# Patient Record
Sex: MALE
[De-identification: names, ages, dates, MRNs, and addresses within clinical notes are randomized per-mention and may not be internally consistent; named-entity substitution may affect disease eponyms.]

---

## 2021-03-28 ENCOUNTER — APPOINTMENT (OUTPATIENT)
Dept: DISASTER EMERGENCY | Facility: OTHER | Age: 46
End: 2021-03-28
Payer: COMMERCIAL

## 2021-03-28 PROCEDURE — 0011A: CPT

## 2021-04-25 ENCOUNTER — APPOINTMENT (OUTPATIENT)
Dept: DISASTER EMERGENCY | Facility: OTHER | Age: 46
End: 2021-04-25
Payer: COMMERCIAL

## 2021-04-25 PROCEDURE — 0012A: CPT

## 2024-09-16 PROBLEM — Z00.00 ENCOUNTER FOR PREVENTIVE HEALTH EXAMINATION: Status: ACTIVE | Noted: 2024-09-16

## 2024-09-24 DIAGNOSIS — I48.91 UNSPECIFIED ATRIAL FIBRILLATION: ICD-10-CM

## 2024-09-24 DIAGNOSIS — G47.33 OBSTRUCTIVE SLEEP APNEA (ADULT) (PEDIATRIC): ICD-10-CM

## 2024-09-24 DIAGNOSIS — E66.01 MORBID (SEVERE) OBESITY DUE TO EXCESS CALORIES: ICD-10-CM

## 2024-09-24 DIAGNOSIS — Z78.9 OTHER SPECIFIED HEALTH STATUS: ICD-10-CM

## 2024-09-24 DIAGNOSIS — I25.10 ATHEROSCLEROTIC HEART DISEASE OF NATIVE CORONARY ARTERY W/OUT ANGINA PECTORIS: ICD-10-CM

## 2024-09-24 DIAGNOSIS — I50.20 UNSPECIFIED SYSTOLIC (CONGESTIVE) HEART FAILURE: ICD-10-CM

## 2024-09-24 RX ORDER — AMIODARONE HYDROCHLORIDE 200 MG/1
200 TABLET ORAL DAILY
Refills: 0 | Status: ACTIVE | COMMUNITY

## 2024-09-24 RX ORDER — METOPROLOL SUCCINATE 100 MG/1
100 TABLET, EXTENDED RELEASE ORAL
Refills: 0 | Status: ACTIVE | COMMUNITY

## 2024-09-24 RX ORDER — SPIRONOLACTONE 25 MG/1
25 TABLET ORAL DAILY
Qty: 30 | Refills: 3 | Status: ACTIVE | COMMUNITY

## 2024-09-24 RX ORDER — LISINOPRIL 5 MG/1
5 TABLET ORAL DAILY
Refills: 0 | Status: ACTIVE | COMMUNITY

## 2024-09-24 RX ORDER — APIXABAN 5 MG/1
5 TABLET, FILM COATED ORAL
Qty: 60 | Refills: 0 | Status: ACTIVE | COMMUNITY

## 2024-09-24 RX ORDER — FUROSEMIDE 40 MG/1
40 TABLET ORAL
Qty: 90 | Refills: 0 | Status: ACTIVE | COMMUNITY

## 2024-10-18 ENCOUNTER — APPOINTMENT (OUTPATIENT)
Age: 49
End: 2024-10-18

## 2024-12-20 ENCOUNTER — APPOINTMENT (OUTPATIENT)
Age: 49
End: 2024-12-20

## 2024-12-20 ENCOUNTER — NON-APPOINTMENT (OUTPATIENT)
Age: 49
End: 2024-12-20

## 2025-01-17 ENCOUNTER — APPOINTMENT (OUTPATIENT)
Age: 50
End: 2025-01-17
Payer: COMMERCIAL

## 2025-01-17 ENCOUNTER — NON-APPOINTMENT (OUTPATIENT)
Age: 50
End: 2025-01-17

## 2025-01-17 DIAGNOSIS — I48.3 TYPICAL ATRIAL FLUTTER: ICD-10-CM

## 2025-01-17 DIAGNOSIS — I48.91 UNSPECIFIED ATRIAL FIBRILLATION: ICD-10-CM

## 2025-01-17 DIAGNOSIS — I50.20 UNSPECIFIED SYSTOLIC (CONGESTIVE) HEART FAILURE: ICD-10-CM

## 2025-01-17 DIAGNOSIS — E66.01 MORBID (SEVERE) OBESITY DUE TO EXCESS CALORIES: ICD-10-CM

## 2025-01-17 PROCEDURE — 93000 ELECTROCARDIOGRAM COMPLETE: CPT

## 2025-01-17 PROCEDURE — 99214 OFFICE O/P EST MOD 30 MIN: CPT | Mod: 25

## 2025-01-29 ENCOUNTER — NON-APPOINTMENT (OUTPATIENT)
Age: 50
End: 2025-01-29

## 2025-01-29 ENCOUNTER — INPATIENT (INPATIENT)
Facility: HOSPITAL | Age: 50
LOS: 0 days | Discharge: HOME | DRG: 310 | End: 2025-01-30
Attending: STUDENT IN AN ORGANIZED HEALTH CARE EDUCATION/TRAINING PROGRAM | Admitting: STUDENT IN AN ORGANIZED HEALTH CARE EDUCATION/TRAINING PROGRAM
Payer: COMMERCIAL

## 2025-01-29 ENCOUNTER — TRANSCRIPTION ENCOUNTER (OUTPATIENT)
Age: 50
End: 2025-01-29

## 2025-01-29 VITALS
WEIGHT: 315 LBS | OXYGEN SATURATION: 96 % | HEART RATE: 145 BPM | RESPIRATION RATE: 17 BRPM | HEIGHT: 76 IN | DIASTOLIC BLOOD PRESSURE: 104 MMHG | SYSTOLIC BLOOD PRESSURE: 161 MMHG

## 2025-01-29 DIAGNOSIS — Z87.81 PERSONAL HISTORY OF (HEALED) TRAUMATIC FRACTURE: Chronic | ICD-10-CM

## 2025-01-29 DIAGNOSIS — I48.91 UNSPECIFIED ATRIAL FIBRILLATION: ICD-10-CM

## 2025-01-29 LAB
ABO RH CONFIRMATION: SIGNIFICANT CHANGE UP
ANION GAP SERPL CALC-SCNC: 11 MMOL/L — SIGNIFICANT CHANGE UP (ref 5–17)
BLD GP AB SCN SERPL QL: SIGNIFICANT CHANGE UP
BUN SERPL-MCNC: 23.2 MG/DL — HIGH (ref 8–20)
CALCIUM SERPL-MCNC: 9.4 MG/DL — SIGNIFICANT CHANGE UP (ref 8.4–10.5)
CHLORIDE SERPL-SCNC: 97 MMOL/L — SIGNIFICANT CHANGE UP (ref 96–108)
CO2 SERPL-SCNC: 28 MMOL/L — SIGNIFICANT CHANGE UP (ref 22–29)
CREAT SERPL-MCNC: 1.17 MG/DL — SIGNIFICANT CHANGE UP (ref 0.5–1.3)
EGFR: 76 ML/MIN/1.73M2 — SIGNIFICANT CHANGE UP
GLUCOSE SERPL-MCNC: 96 MG/DL — SIGNIFICANT CHANGE UP (ref 70–99)
HCT VFR BLD CALC: 44.1 % — SIGNIFICANT CHANGE UP (ref 39–50)
HGB BLD-MCNC: 15 G/DL — SIGNIFICANT CHANGE UP (ref 13–17)
MAGNESIUM SERPL-MCNC: 1.9 MG/DL — SIGNIFICANT CHANGE UP (ref 1.6–2.6)
MCHC RBC-ENTMCNC: 31.1 PG — SIGNIFICANT CHANGE UP (ref 27–34)
MCHC RBC-ENTMCNC: 34 G/DL — SIGNIFICANT CHANGE UP (ref 32–36)
MCV RBC AUTO: 91.5 FL — SIGNIFICANT CHANGE UP (ref 80–100)
PLATELET # BLD AUTO: 214 K/UL — SIGNIFICANT CHANGE UP (ref 150–400)
POTASSIUM SERPL-MCNC: 4.4 MMOL/L — SIGNIFICANT CHANGE UP (ref 3.5–5.3)
POTASSIUM SERPL-SCNC: 4.4 MMOL/L — SIGNIFICANT CHANGE UP (ref 3.5–5.3)
RBC # BLD: 4.82 M/UL — SIGNIFICANT CHANGE UP (ref 4.2–5.8)
RBC # FLD: 13.4 % — SIGNIFICANT CHANGE UP (ref 10.3–14.5)
SODIUM SERPL-SCNC: 135 MMOL/L — SIGNIFICANT CHANGE UP (ref 135–145)
WBC # BLD: 10.99 K/UL — HIGH (ref 3.8–10.5)
WBC # FLD AUTO: 10.99 K/UL — HIGH (ref 3.8–10.5)

## 2025-01-29 PROCEDURE — 93010 ELECTROCARDIOGRAM REPORT: CPT

## 2025-01-29 PROCEDURE — 93655 ICAR CATH ABLTJ DSCRT ARRHYT: CPT | Mod: 1L

## 2025-01-29 PROCEDURE — 93656 COMPRE EP EVAL ABLTJ ATR FIB: CPT | Mod: 1L

## 2025-01-29 RX ORDER — AMIODARONE HYDROCHLORIDE 50 MG/ML
200 INJECTION, SOLUTION INTRAVENOUS DAILY
Refills: 0 | Status: DISCONTINUED | OUTPATIENT
Start: 2025-01-29 | End: 2025-01-30

## 2025-01-29 RX ORDER — ASPIRIN 81 MG/1
1 TABLET, COATED ORAL
Refills: 0 | DISCHARGE

## 2025-01-29 RX ORDER — APIXABAN 5 MG/1
5 TABLET, FILM COATED ORAL
Refills: 0 | Status: DISCONTINUED | OUTPATIENT
Start: 2025-01-29 | End: 2025-01-30

## 2025-01-29 RX ORDER — ONDANSETRON 4 MG/1
4 TABLET, ORALLY DISINTEGRATING ORAL EVERY 4 HOURS
Refills: 0 | Status: DISCONTINUED | OUTPATIENT
Start: 2025-01-29 | End: 2025-01-30

## 2025-01-29 RX ORDER — ASPIRIN 81 MG/1
81 TABLET, COATED ORAL DAILY
Refills: 0 | Status: DISCONTINUED | OUTPATIENT
Start: 2025-01-29 | End: 2025-01-30

## 2025-01-29 RX ORDER — METOPROLOL SUCCINATE 25 MG
100 TABLET, EXTENDED RELEASE 24 HR ORAL DAILY
Refills: 0 | Status: DISCONTINUED | OUTPATIENT
Start: 2025-01-29 | End: 2025-01-30

## 2025-01-29 RX ORDER — ACETAMINOPHEN 160 MG/5ML
650 SUSPENSION ORAL EVERY 6 HOURS
Refills: 0 | Status: DISCONTINUED | OUTPATIENT
Start: 2025-01-29 | End: 2025-01-30

## 2025-01-29 RX ORDER — PHENOL 1.4 %
1 AEROSOL, SPRAY (ML) MUCOUS MEMBRANE
Refills: 0 | Status: DISCONTINUED | OUTPATIENT
Start: 2025-01-29 | End: 2025-01-30

## 2025-01-29 RX ORDER — APIXABAN 5 MG/1
1 TABLET, FILM COATED ORAL
Refills: 0 | DISCHARGE

## 2025-01-29 RX ORDER — ALPRAZOLAM 2 MG
0.25 TABLET ORAL EVERY 6 HOURS
Refills: 0 | Status: DISCONTINUED | OUTPATIENT
Start: 2025-01-29 | End: 2025-01-30

## 2025-01-29 RX ORDER — MAGNESIUM, ALUMINUM HYDROXIDE 200-225/5
30 SUSPENSION, ORAL (FINAL DOSE FORM) ORAL EVERY 6 HOURS
Refills: 0 | Status: DISCONTINUED | OUTPATIENT
Start: 2025-01-29 | End: 2025-01-30

## 2025-01-29 RX ADMIN — APIXABAN 5 MILLIGRAM(S): 5 TABLET, FILM COATED ORAL at 21:12

## 2025-01-29 NOTE — DISCHARGE NOTE PROVIDER - NSDCMRMEDTOKEN_GEN_ALL_CORE_FT
amiodarone 400 mg oral tablet: 1 tab(s) orally 2 times a day  aspirin 81 mg oral tablet, chewable: 1 tab(s) chewed once a day  Eliquis 5 mg oral tablet: 1 tab(s) orally 2 times a day  furosemide 40 mg oral tablet: 1 tab(s) orally once a day  lisinopril 5 mg oral tablet: 1 tab(s) orally once a day  metoprolol succinate 100 mg oral tablet, extended release: 1 tab(s) orally 2 times a day  spironolactone 25 mg oral tablet: 1 tab(s) orally once a day   amiodarone 200 mg oral tablet: 1 tab(s) orally once a day  aspirin 81 mg oral tablet, chewable: 1 tab(s) chewed once a day  Eliquis 5 mg oral tablet: 1 tab(s) orally 2 times a day  furosemide 40 mg oral tablet: 1 tab(s) orally once a day  lisinopril 5 mg oral tablet: 1 tab(s) orally once a day  metoprolol succinate 100 mg oral tablet, extended release: 1 tab(s) orally once a day  spironolactone 25 mg oral tablet: 1 tab(s) orally once a day

## 2025-01-29 NOTE — H&P PST ADULT - ASSESSMENT
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 Newell.   Recently seen as outpatient by Dr. Newell on 1/17/25. Was advised to increase Toprol to 100mg BID and increase amiodarone 400mg twice a day for a week due to ongoing Atypical flutter with RVR.    On Eliquis 5mg BID, last dose yesterday evening (1/28, PM)  Has not taken Metoprolol or Amiodarone since yesterday morning.    - IV Heparin lock  - STAT labs  - Maintain NPO status for procedure.   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 Newell.   Recently seen as outpatient by Dr. Newell on 1/17/25. Was advised to increase Toprol to 100mg BID and increase amiodarone 400mg twice a day for a week due to ongoing Atypical flutter with RVR.    On Eliquis 5mg BID, last dose yesterday evening (1/28, PM)  Has not taken Metoprolol or Amiodarone since yesterday morning.    - IV Heparin lock  - STAT labs  - 2 units of PRBC on hold   - Maintain NPO status for procedure.   - Consent to be obtained by BRANT HATCH

## 2025-01-29 NOTE — DISCHARGE NOTE PROVIDER - CARE PROVIDER_API CALL
Robert Newell  Cardiology  [address] [place] [id number]  Phone: [phone]  Fax: [phone]  Follow Up Time: 2 weeks

## 2025-01-29 NOTE — H&P PST ADULT - NSICDXPASTMEDICALHX_GEN_ALL_CORE_FT
PAST MEDICAL HISTORY:  Acute systolic congestive heart failure     Atypical atrial flutter     LV dysfunction     Nonischemic cardiomyopathy     Nonobstructive atherosclerosis of coronary artery     RITU (obstructive sleep apnea)

## 2025-01-29 NOTE — DISCHARGE NOTE PROVIDER - HOSPITAL COURSE
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 Patient presented electively and is now s/p uncomplicated pulsed field ablation of atrial fibrillation (PVI) and RF ablation of aflutter (CTI) via b/l FV access.  Hemostasis achieved with Vascade to b/l FV.   The patient was observed overnight without event and was discharged home the following morning with a plan for outpatient follow up.

## 2025-01-29 NOTE — PROGRESS NOTE ADULT - SUBJECTIVE AND OBJECTIVE BOX
PROCEDURE(S): Pulsed Field Ablation of Atrial Fibrillation    ELECTROPHYSIOLOGIST(S): Robert Newell MD         COMPLICATIONS:  none        DISPOSITION:  observation     CONDITION: stable  EBL: <15mL      Pt doing well s/p pulsed field ablation of atrial fibrillation (PVI) and RF ablation of aflutter (CTI) via b/l FV access.       MEDICATIONS  (STANDING):  aMIOdarone    Tablet 200 milliGRAM(s) Oral daily  apixaban 5 milliGRAM(s) Oral <User Schedule>  aspirin  chewable 81 milliGRAM(s) Oral daily  lisinopril 5 milliGRAM(s) Oral daily  metoprolol succinate  milliGRAM(s) Oral daily    MEDICATIONS  (PRN):  acetaminophen     Tablet .. 650 milliGRAM(s) Oral every 6 hours PRN Mild Pain (1 - 3), Moderate Pain (4 - 6)  ALPRAZolam 0.25 milliGRAM(s) Oral every 6 hours PRN anxiety/insomnia  aluminum hydroxide/magnesium hydroxide/simethicone Suspension 30 milliLiter(s) Oral every 6 hours PRN Dyspepsia  benzocaine/menthol Lozenge 1 Lozenge Oral every 2 hours PRN Sore Throat  ondansetron Injectable 4 milliGRAM(s) IV Push every 4 hours PRN Nausea and/or Vomiting      Allergies    No Known Allergies    Intolerances          Exam:   T(C): 36.6 (01-29-25 @ 10:45), Max: 36.6 (01-29-25 @ 10:45)  HR: 139 (01-29-25 @ 10:45) (139 - 145)  BP: 129/90 (01-29-25 @ 10:45) (129/90 - 161/104)  RR: 18 (01-29-25 @ 10:45) (17 - 18)  SpO2: 96% (01-29-25 @ 10:45) (96% - 96%)    VSS, NAD, A&O x 3  Card: S1/S2, RRR, no m/g/r  Resp: lungs CTA b/l  Abd: S/NT/ND  Groins: hemostatic sutures in place; sites C/D/I; no bleeding, hematoma, erythema, exudate or edema  Ext: no edema; distal pulses intact    I/Os: net +     ECG:    Assessment:   49 year old with a past medical history of RITU (not currently on CPAP), obesity, nonobstructive coronary artery disease and 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 Patient presented electively and is now s/p uncomplicated pulsed field ablation of atrial fibrillation (PVI) and RF ablation of aflutter (CTI) via b/l FV access.  Hemostasis achieved with Vascade to b/l FV.     Plan:   Bedrest x 4 hours, then OOB with assistance and progress as tolerated.   Groin sutures to be removed by EP service in AM.   Radial art line to be removed once pt fully awake with stable vitals >1 hour post op.   Pending groin status: Eliquis 5mg PO BID to resume at 2100 tonight.   DO NOT HOLD, INTERRUPT OR REVERSE ANTICOAGULATION WITHOUT EXPLICIT APPROVAL FROM EP SERVICE.    Decrease Toprol to 100mg ONCE daily.   Decrease Amiodarone to 200mg ONCE daily.  Continue other home medications.   Strict I/Os.  Please encourage incentive spirometry and ambulation once able.  Observation and monitoring on telemetry overnight with anticipated discharge in the AM and outpt follow up in 2-4 weeks.     INCOMPLETE NOTE PROCEDURE(S): Pulsed Field Ablation of Atrial Fibrillation    ELECTROPHYSIOLOGIST(S): Robert Newell MD         COMPLICATIONS:  none        DISPOSITION:  observation     CONDITION: stable  EBL: <15mL      Pt doing well s/p pulsed field ablation of atrial fibrillation (PVI) and RF ablation of aflutter (CTI) via b/l FV access.       MEDICATIONS  (STANDING):  aMIOdarone    Tablet 200 milliGRAM(s) Oral daily  apixaban 5 milliGRAM(s) Oral <User Schedule>  aspirin  chewable 81 milliGRAM(s) Oral daily  lisinopril 5 milliGRAM(s) Oral daily  metoprolol succinate  milliGRAM(s) Oral daily    MEDICATIONS  (PRN):  acetaminophen     Tablet .. 650 milliGRAM(s) Oral every 6 hours PRN Mild Pain (1 - 3), Moderate Pain (4 - 6)  ALPRAZolam 0.25 milliGRAM(s) Oral every 6 hours PRN anxiety/insomnia  aluminum hydroxide/magnesium hydroxide/simethicone Suspension 30 milliLiter(s) Oral every 6 hours PRN Dyspepsia  benzocaine/menthol Lozenge 1 Lozenge Oral every 2 hours PRN Sore Throat  ondansetron Injectable 4 milliGRAM(s) IV Push every 4 hours PRN Nausea and/or Vomiting      Allergies    No Known Allergies    Intolerances          Exam:   T(C): 36.6 (01-29-25 @ 10:45), Max: 36.6 (01-29-25 @ 10:45)  HR: 139 (01-29-25 @ 10:45) (139 - 145)  BP: 129/90 (01-29-25 @ 10:45) (129/90 - 161/104)  RR: 18 (01-29-25 @ 10:45) (17 - 18)  SpO2: 96% (01-29-25 @ 10:45) (96% - 96%)    VSS, NAD, A&O x 3  Card: S1/S2, RRR, no m/g/r  Resp: lungs CTA b/l  Abd: S/NT/ND  Groins: hemostatic sutures in place; sites C/D/I; no bleeding, hematoma, erythema, exudate or edema  Ext: no edema; distal pulses intact    I/Os: net + 1800cc    ECG:  SR @ 70bpm, narrow QRS    Assessment:   49 year old with a past medical history of RITU (not currently on CPAP), obesity, nonobstructive coronary artery disease and 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 Patient presented electively and is now s/p uncomplicated pulsed field ablation of atrial fibrillation (PVI) and RF ablation of aflutter (CTI) via b/l FV access.  Hemostasis achieved with Vascade to b/l FV.     Plan:   Bedrest x 4 hours, then OOB with assistance and progress as tolerated.   Groin sutures to be removed by EP service in AM.   Radial art line to be removed once pt fully awake with stable vitals >1 hour post op.   Pending groin status: Eliquis 5mg PO BID to resume at 2100 tonight.   DO NOT HOLD, INTERRUPT OR REVERSE ANTICOAGULATION WITHOUT EXPLICIT APPROVAL FROM EP SERVICE.    Decrease Toprol to 100mg ONCE daily.   Decrease Amiodarone to 200mg ONCE daily.  Continue other home medications.   Strict I/Os.  Please encourage incentive spirometry and ambulation once able.  Observation and monitoring on telemetry overnight with anticipated discharge in the AM and outpt follow up in 2-4 weeks.

## 2025-01-29 NOTE — H&P PST ADULT - HISTORY OF PRESENT ILLNESS
49 year old with a past medical history of RITU, obesity, who presented to [facility] 2024 with lower extremity edema and chest pain and rapid atrial flutter and atrial fibrillation, found to have new LV dysfunction (EF 25-30%) s/p [place] with nonobstructive disease, Underwent MELQUIADES/DCCV (24) however had immediate ERAF. He was started on amiodarone infusion and had a repeat successful DCCV. He was discharged home (24) but found to be in recurrent atypical flutter at outpatient cardiology follow-up on 24.    Now presents for AF ablation with Dr. Newell.  Recently seen as outpatient by Dr. Newell on 25. Was advised to increase Toprol to 100mg BID and increase amiodarone 400mg twice a day for a week.       CARDIOLOGY SUMMARY  ECG 24:    EC25 - rapid typical atrial flutter, rate 130 BPM    Echo: 24 TTE: LVEF 20-25%, LV mildly dilated, severly reduced LV, RV mildly reduced, moderate mitral valve regurgitation,    Cardiac Cath/PCI: 2024 - nonobstructive CAD 49 year old with a past medical history of RITU, obesity, who presented to [facility] 2024 with lower extremity edema and chest pain and rapid atrial flutter and atrial fibrillation, found to have new LV dysfunction (EF 25-30%) s/p [facility] with nonobstructive disease, Underwent MELQUIADES/DCCV (24) however had immediate ERAF. He was started on amiodarone infusion and had a repeat successful DCCV. He was discharged home (24) but found to be in recurrent atypical flutter at outpatient cardiology follow-up on 24.    Now presents for AF ablation with Dr. Newell.  Recently seen as outpatient by Dr. Newell on 25. Was advised to increase Toprol to 100mg BID and increase amiodarone 400mg twice a day for a week.   Presents today and remains in Aflutter with RVR in the 140s.      CARDIOLOGY SUMMARY  ECG 24:    EC25 - rapid typical atrial flutter, rate 130 BPM    Echo: 24 TTE: LVEF 20-25%, LV mildly dilated, severly reduced LV, RV mildly reduced, moderate mitral valve regurgitation,    Cardiac Cath/PCI: 2024 - nonobstructive CAD 49 year old with a past medical history of RITU (not currently on CPAP), obesity, who presented to [facility] 2024 with lower extremity edema and chest pain and rapid atrial flutter and atrial fibrillation, found to have new LV dysfunction (EF 25-30%) s/p [facility] with nonobstructive disease, Underwent MELQUIADES/DCCV (24) however had immediate ERAF. He was started on amiodarone infusion and had a repeat successful DCCV. He was discharged home (24) but found to be in recurrent atypical flutter at outpatient cardiology follow-up on 24.    Now presents for AF ablation with Dr. Newell.  Recently seen as outpatient by Dr. Newell on 25. Was advised to increase Toprol to 100mg BID and increase amiodarone 400mg twice a day for a week.   Presents today and remains in Aflutter with RVR in the 140s.  Denies CP, SOB, COOLEY, orthopnea, syncope or presyncope.  Ongoing LE edema which has improved since prior hospitalization.       CARDIOLOGY SUMMARY  ECG 24:  Aflutter with RVR @ 147bpm  EC25 - rapid typical atrial flutter, rate 130 BPM    Echo: 24 TTE: LVEF 20-25%, LV mildly dilated, severly reduced LV, RV mildly reduced, moderate mitral valve regurgitation,    Cardiac Cath/PCI: 2024 - nonobstructive CAD

## 2025-01-30 ENCOUNTER — TRANSCRIPTION ENCOUNTER (OUTPATIENT)
Age: 50
End: 2025-01-30

## 2025-01-30 VITALS
TEMPERATURE: 98 F | SYSTOLIC BLOOD PRESSURE: 113 MMHG | DIASTOLIC BLOOD PRESSURE: 73 MMHG | HEART RATE: 60 BPM | RESPIRATION RATE: 18 BRPM | OXYGEN SATURATION: 95 %

## 2025-01-30 LAB
ANION GAP SERPL CALC-SCNC: 10 MMOL/L — SIGNIFICANT CHANGE UP (ref 5–17)
BUN SERPL-MCNC: 17.1 MG/DL — SIGNIFICANT CHANGE UP (ref 8–20)
CALCIUM SERPL-MCNC: 8.4 MG/DL — SIGNIFICANT CHANGE UP (ref 8.4–10.5)
CHLORIDE SERPL-SCNC: 100 MMOL/L — SIGNIFICANT CHANGE UP (ref 96–108)
CO2 SERPL-SCNC: 26 MMOL/L — SIGNIFICANT CHANGE UP (ref 22–29)
CREAT SERPL-MCNC: 1.07 MG/DL — SIGNIFICANT CHANGE UP (ref 0.5–1.3)
EGFR: 85 ML/MIN/1.73M2 — SIGNIFICANT CHANGE UP
GLUCOSE SERPL-MCNC: 107 MG/DL — HIGH (ref 70–99)
HCT VFR BLD CALC: 39.9 % — SIGNIFICANT CHANGE UP (ref 39–50)
HGB BLD-MCNC: 12.8 G/DL — LOW (ref 13–17)
MAGNESIUM SERPL-MCNC: 1.9 MG/DL — SIGNIFICANT CHANGE UP (ref 1.6–2.6)
MCHC RBC-ENTMCNC: 30.8 PG — SIGNIFICANT CHANGE UP (ref 27–34)
MCHC RBC-ENTMCNC: 32.1 G/DL — SIGNIFICANT CHANGE UP (ref 32–36)
MCV RBC AUTO: 95.9 FL — SIGNIFICANT CHANGE UP (ref 80–100)
PLATELET # BLD AUTO: 190 K/UL — SIGNIFICANT CHANGE UP (ref 150–400)
POTASSIUM SERPL-MCNC: 4.4 MMOL/L — SIGNIFICANT CHANGE UP (ref 3.5–5.3)
POTASSIUM SERPL-SCNC: 4.4 MMOL/L — SIGNIFICANT CHANGE UP (ref 3.5–5.3)
RBC # BLD: 4.16 M/UL — LOW (ref 4.2–5.8)
RBC # FLD: 13.7 % — SIGNIFICANT CHANGE UP (ref 10.3–14.5)
SODIUM SERPL-SCNC: 136 MMOL/L — SIGNIFICANT CHANGE UP (ref 135–145)
WBC # BLD: 11.79 K/UL — HIGH (ref 3.8–10.5)
WBC # FLD AUTO: 11.79 K/UL — HIGH (ref 3.8–10.5)

## 2025-01-30 PROCEDURE — 93010 ELECTROCARDIOGRAM REPORT: CPT

## 2025-01-30 PROCEDURE — C1760: CPT

## 2025-01-30 PROCEDURE — 83735 ASSAY OF MAGNESIUM: CPT

## 2025-01-30 PROCEDURE — C1732: CPT

## 2025-01-30 PROCEDURE — 36415 COLL VENOUS BLD VENIPUNCTURE: CPT

## 2025-01-30 PROCEDURE — 86850 RBC ANTIBODY SCREEN: CPT

## 2025-01-30 PROCEDURE — 85027 COMPLETE CBC AUTOMATED: CPT

## 2025-01-30 PROCEDURE — 86901 BLOOD TYPING SEROLOGIC RH(D): CPT

## 2025-01-30 PROCEDURE — C1766: CPT

## 2025-01-30 PROCEDURE — C1889: CPT

## 2025-01-30 PROCEDURE — C1894: CPT

## 2025-01-30 PROCEDURE — 80048 BASIC METABOLIC PNL TOTAL CA: CPT

## 2025-01-30 PROCEDURE — C1733: CPT

## 2025-01-30 PROCEDURE — C1769: CPT

## 2025-01-30 PROCEDURE — C9399: CPT

## 2025-01-30 PROCEDURE — 93655 ICAR CATH ABLTJ DSCRT ARRHYT: CPT

## 2025-01-30 PROCEDURE — 93005 ELECTROCARDIOGRAM TRACING: CPT

## 2025-01-30 PROCEDURE — 86900 BLOOD TYPING SEROLOGIC ABO: CPT

## 2025-01-30 PROCEDURE — 93656 COMPRE EP EVAL ABLTJ ATR FIB: CPT

## 2025-01-30 PROCEDURE — C1730: CPT

## 2025-01-30 RX ORDER — AMIODARONE HYDROCHLORIDE 50 MG/ML
1 INJECTION, SOLUTION INTRAVENOUS
Refills: 0 | DISCHARGE

## 2025-01-30 RX ORDER — METOPROLOL SUCCINATE 25 MG
1 TABLET, EXTENDED RELEASE 24 HR ORAL
Qty: 0 | Refills: 0 | DISCHARGE
Start: 2025-01-30

## 2025-01-30 RX ORDER — AMIODARONE HYDROCHLORIDE 50 MG/ML
1 INJECTION, SOLUTION INTRAVENOUS
Qty: 0 | Refills: 0 | DISCHARGE
Start: 2025-01-30

## 2025-01-30 RX ORDER — METOPROLOL SUCCINATE 25 MG
1 TABLET, EXTENDED RELEASE 24 HR ORAL
Refills: 0 | DISCHARGE

## 2025-01-30 RX ADMIN — ASPIRIN 81 MILLIGRAM(S): 81 TABLET, COATED ORAL at 09:49

## 2025-01-30 RX ADMIN — Medication 5 MILLIGRAM(S): at 05:51

## 2025-01-30 RX ADMIN — AMIODARONE HYDROCHLORIDE 200 MILLIGRAM(S): 50 INJECTION, SOLUTION INTRAVENOUS at 05:50

## 2025-01-30 RX ADMIN — Medication 100 MILLIGRAM(S): at 05:51

## 2025-01-30 RX ADMIN — APIXABAN 5 MILLIGRAM(S): 5 TABLET, FILM COATED ORAL at 09:49

## 2025-01-30 NOTE — PROGRESS NOTE ADULT - SUBJECTIVE AND OBJECTIVE BOX
Patient seen today in bed. No overnight events.     EKG: SR at 61bpm; qRSD 98ms; DE 190ms  TELE: SR; no events     MEDICATIONS  (STANDING):  aMIOdarone    Tablet 200 milliGRAM(s) Oral daily  apixaban 5 milliGRAM(s) Oral <User Schedule>  aspirin  chewable 81 milliGRAM(s) Oral daily  lisinopril 5 milliGRAM(s) Oral daily  metoprolol succinate  milliGRAM(s) Oral daily    MEDICATIONS  (PRN):  acetaminophen     Tablet .. 650 milliGRAM(s) Oral every 6 hours PRN Mild Pain (1 - 3), Moderate Pain (4 - 6)  ALPRAZolam 0.25 milliGRAM(s) Oral every 6 hours PRN anxiety/insomnia  aluminum hydroxide/magnesium hydroxide/simethicone Suspension 30 milliLiter(s) Oral every 6 hours PRN Dyspepsia  benzocaine/menthol Lozenge 1 Lozenge Oral every 2 hours PRN Sore Throat  ondansetron Injectable 4 milliGRAM(s) IV Push every 4 hours PRN Nausea and/or Vomiting    Allergies  No Known Allergies    PAST MEDICAL & SURGICAL HISTORY:  Atypical atrial flutter  RITU (obstructive sleep apnea)  Nonobstructive atherosclerosis of coronary artery  Nonischemic cardiomyopathy  LV dysfunction  Acute systolic congestive heart failure  History of fracture of right ankle    Vital Signs Last 24 Hrs  T(C): 36.8 (30 Jan 2025 08:21), Max: 36.8 (29 Jan 2025 21:32)  T(F): 98.2 (30 Jan 2025 08:21), Max: 98.2 (29 Jan 2025 21:32)  HR: 60 (30 Jan 2025 08:21) (60 - 139)  BP: 113/73 (30 Jan 2025 08:21) (113/73 - 149/96)  BP(mean): 114 (29 Jan 2025 21:32) (114 - 114)  RR: 18 (30 Jan 2025 08:21) (16 - 20)  SpO2: 95% (30 Jan 2025 08:21) (95% - 100%)    Parameters below as of 30 Jan 2025 08:21  Patient On (Oxygen Delivery Method): room air    Physical Exam:  Constitutional: NAD, AAOx3  Cardiovascular: +S1S2 RRR  Pulmonary: CTA b/l, unlabored  GI: soft NTND +BS  Extremities: no pedal edema,  Left and right groin: No hematoma or ecchymosis.  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     - Discharge home tody

## 2025-01-30 NOTE — DISCHARGE NOTE NURSING/CASE MANAGEMENT/SOCIAL WORK - FINANCIAL ASSISTANCE
[facility] provides services at a reduced cost to those who are determined to be eligible through [facility]’s financial assistance program. Information regarding [facility]’s financial assistance program can be found by going to [email or web address] or by calling [phone].

## 2025-01-30 NOTE — DISCHARGE NOTE NURSING/CASE MANAGEMENT/SOCIAL WORK - PATIENT PORTAL LINK FT
You can access the FollowMyHealth Patient Portal offered by [facility] by registering at the following website: [email or web address]. By joining [employer]’s FollowMyHealth portal, you will also be able to view your health information using other applications (apps) compatible with our system.

## 2025-02-03 PROBLEM — G47.33 OBSTRUCTIVE SLEEP APNEA (ADULT) (PEDIATRIC): Chronic | Status: ACTIVE | Noted: 2025-01-29

## 2025-02-03 PROBLEM — I42.8 OTHER CARDIOMYOPATHIES: Chronic | Status: ACTIVE | Noted: 2025-01-29

## 2025-02-03 PROBLEM — I51.9 HEART DISEASE, UNSPECIFIED: Chronic | Status: ACTIVE | Noted: 2025-01-29

## 2025-02-03 PROBLEM — I50.21 ACUTE SYSTOLIC (CONGESTIVE) HEART FAILURE: Chronic | Status: ACTIVE | Noted: 2025-01-29

## 2025-02-03 PROBLEM — I25.10 ATHEROSCLEROTIC HEART DISEASE OF NATIVE CORONARY ARTERY WITHOUT ANGINA PECTORIS: Chronic | Status: ACTIVE | Noted: 2025-01-29

## 2025-02-21 ENCOUNTER — APPOINTMENT (OUTPATIENT)
Age: 50
End: 2025-02-21
Payer: COMMERCIAL

## 2025-02-21 VITALS
SYSTOLIC BLOOD PRESSURE: 132 MMHG | RESPIRATION RATE: 16 BRPM | HEART RATE: 65 BPM | DIASTOLIC BLOOD PRESSURE: 76 MMHG | OXYGEN SATURATION: 94 %

## 2025-02-21 VITALS — WEIGHT: 315 LBS | HEIGHT: 76 IN | BODY MASS INDEX: 38.36 KG/M2

## 2025-02-21 DIAGNOSIS — I50.20 UNSPECIFIED SYSTOLIC (CONGESTIVE) HEART FAILURE: ICD-10-CM

## 2025-02-21 DIAGNOSIS — I25.10 ATHEROSCLEROTIC HEART DISEASE OF NATIVE CORONARY ARTERY W/OUT ANGINA PECTORIS: ICD-10-CM

## 2025-02-21 DIAGNOSIS — I48.3 TYPICAL ATRIAL FLUTTER: ICD-10-CM

## 2025-02-21 DIAGNOSIS — I48.91 UNSPECIFIED ATRIAL FIBRILLATION: ICD-10-CM

## 2025-02-21 PROCEDURE — 93000 ELECTROCARDIOGRAM COMPLETE: CPT

## 2025-02-21 PROCEDURE — 99213 OFFICE O/P EST LOW 20 MIN: CPT | Mod: 25

## 2025-04-21 PROCEDURE — 93246 EXT ECG>7D<15D RECORDING: CPT | Mod: 1L

## 2025-07-25 ENCOUNTER — APPOINTMENT (OUTPATIENT)
Age: 50
End: 2025-07-25
Payer: COMMERCIAL

## 2025-07-25 VITALS
DIASTOLIC BLOOD PRESSURE: 88 MMHG | HEART RATE: 59 BPM | WEIGHT: 315 LBS | BODY MASS INDEX: 38.36 KG/M2 | HEIGHT: 76 IN | OXYGEN SATURATION: 97 % | SYSTOLIC BLOOD PRESSURE: 126 MMHG

## 2025-07-25 DIAGNOSIS — I48.3 TYPICAL ATRIAL FLUTTER: ICD-10-CM

## 2025-07-25 DIAGNOSIS — I50.20 UNSPECIFIED SYSTOLIC (CONGESTIVE) HEART FAILURE: ICD-10-CM

## 2025-07-25 DIAGNOSIS — I48.91 UNSPECIFIED ATRIAL FIBRILLATION: ICD-10-CM

## 2025-07-25 PROCEDURE — 93000 ELECTROCARDIOGRAM COMPLETE: CPT

## 2025-07-25 PROCEDURE — 99213 OFFICE O/P EST LOW 20 MIN: CPT | Mod: 25
